# Patient Record
Sex: MALE | Race: WHITE
[De-identification: names, ages, dates, MRNs, and addresses within clinical notes are randomized per-mention and may not be internally consistent; named-entity substitution may affect disease eponyms.]

---

## 2021-12-10 ENCOUNTER — HOSPITAL ENCOUNTER (EMERGENCY)
Dept: HOSPITAL 61 - ER | Age: 61
Discharge: HOME | End: 2021-12-10
Payer: COMMERCIAL

## 2021-12-10 VITALS — WEIGHT: 198.64 LBS | BODY MASS INDEX: 29.42 KG/M2 | HEIGHT: 69 IN

## 2021-12-10 VITALS — DIASTOLIC BLOOD PRESSURE: 75 MMHG | SYSTOLIC BLOOD PRESSURE: 130 MMHG

## 2021-12-10 DIAGNOSIS — K52.9: Primary | ICD-10-CM

## 2021-12-10 DIAGNOSIS — F10.939: ICD-10-CM
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
## 2021-12-10 DIAGNOSIS — Z88.8: ICD-10-CM

## 2021-12-10 DIAGNOSIS — G89.29: ICD-10-CM

## 2021-12-10 DIAGNOSIS — I10: ICD-10-CM

## 2021-12-10 DIAGNOSIS — M19.90: ICD-10-CM

## 2021-12-10 DIAGNOSIS — E11.9: ICD-10-CM

## 2021-12-10 DIAGNOSIS — Z88.0: ICD-10-CM

## 2021-12-10 DIAGNOSIS — J44.9: ICD-10-CM

## 2021-12-10 DIAGNOSIS — F17.200: ICD-10-CM

## 2021-12-10 LAB
ALBUMIN SERPL-MCNC: 4.3 G/DL (ref 3.4–5)
ALBUMIN/GLOB SERPL: 1.2 {RATIO} (ref 1–1.7)
ALP SERPL-CCNC: 96 U/L (ref 46–116)
ALT SERPL-CCNC: 31 U/L (ref 16–63)
ANION GAP SERPL CALC-SCNC: 18 MMOL/L (ref 6–14)
AST SERPL-CCNC: 24 U/L (ref 15–37)
BASOPHILS # BLD AUTO: 0 X10^3/UL (ref 0–0.2)
BASOPHILS NFR BLD: 0 % (ref 0–3)
BILIRUB SERPL-MCNC: 0.6 MG/DL (ref 0.2–1)
BUN SERPL-MCNC: 28 MG/DL (ref 8–26)
BUN/CREAT SERPL: 23 (ref 6–20)
CALCIUM SERPL-MCNC: 10 MG/DL (ref 8.5–10.1)
CHLORIDE SERPL-SCNC: 100 MMOL/L (ref 98–107)
CO2 SERPL-SCNC: 22 MMOL/L (ref 21–32)
CREAT SERPL-MCNC: 1.2 MG/DL (ref 0.7–1.3)
EOSINOPHIL NFR BLD: 0 % (ref 0–3)
EOSINOPHIL NFR BLD: 0 X10^3/UL (ref 0–0.7)
ERYTHROCYTE [DISTWIDTH] IN BLOOD BY AUTOMATED COUNT: 13.5 % (ref 11.5–14.5)
GFR SERPLBLD BASED ON 1.73 SQ M-ARVRAT: 61.6 ML/MIN
GLUCOSE SERPL-MCNC: 167 MG/DL (ref 70–99)
HCT VFR BLD CALC: 42.8 % (ref 39–53)
HGB BLD-MCNC: 14.3 G/DL (ref 13–17.5)
LIPASE: 54 U/L (ref 73–393)
LYMPHOCYTES # BLD: 1.1 X10^3/UL (ref 1–4.8)
LYMPHOCYTES NFR BLD AUTO: 10 % (ref 24–48)
MCH RBC QN AUTO: 31 PG (ref 25–35)
MCHC RBC AUTO-ENTMCNC: 34 G/DL (ref 31–37)
MCV RBC AUTO: 94 FL (ref 79–100)
MONO #: 0.6 X10^3/UL (ref 0–1.1)
MONOCYTES NFR BLD: 5 % (ref 0–9)
NEUT #: 9.6 X10^3/UL (ref 1.8–7.7)
NEUTROPHILS NFR BLD AUTO: 85 % (ref 31–73)
PLATELET # BLD AUTO: 202 X10^3/UL (ref 140–400)
POTASSIUM SERPL-SCNC: 3.9 MMOL/L (ref 3.5–5.1)
PROT SERPL-MCNC: 7.8 G/DL (ref 6.4–8.2)
RBC # BLD AUTO: 4.58 X10^6/UL (ref 4.3–5.7)
SODIUM SERPL-SCNC: 140 MMOL/L (ref 136–145)
WBC # BLD AUTO: 11.3 X10^3/UL (ref 4–11)

## 2021-12-10 PROCEDURE — 36415 COLL VENOUS BLD VENIPUNCTURE: CPT

## 2021-12-10 PROCEDURE — 80053 COMPREHEN METABOLIC PANEL: CPT

## 2021-12-10 PROCEDURE — 83690 ASSAY OF LIPASE: CPT

## 2021-12-10 PROCEDURE — 99285 EMERGENCY DEPT VISIT HI MDM: CPT

## 2021-12-10 PROCEDURE — 74177 CT ABD & PELVIS W/CONTRAST: CPT

## 2021-12-10 PROCEDURE — 96361 HYDRATE IV INFUSION ADD-ON: CPT

## 2021-12-10 PROCEDURE — 85025 COMPLETE CBC W/AUTO DIFF WBC: CPT

## 2021-12-10 PROCEDURE — 96374 THER/PROPH/DIAG INJ IV PUSH: CPT

## 2021-12-10 PROCEDURE — 96375 TX/PRO/DX INJ NEW DRUG ADDON: CPT

## 2021-12-10 PROCEDURE — 82140 ASSAY OF AMMONIA: CPT

## 2021-12-10 NOTE — PHYS DOC
Past Medical History


Past Medical History:  Arthritis, COPD, Diabetes-Type II, Hypertension


Additional Past Medical Histor:  CHRONIC REGIONAL SYNDROME,CHRONIC RIGHT GROIN 

PAIN


 (RASHID BAUMANN)


Past Surgical History:  No Surgical History, Other


Additional Past Surgical Histo:  HERNIA X2


 (RASHID BAUMANN)


Smoking Status:  Current Every Day Smoker


Alcohol Use:  Heavy


 (RASHID BAUMANN)





General Adult


EDM:


Chief Complaint:  ABDOMINAL PAIN





HPI:


HPI:





Patient is a 61 year old male who presents with abdominal pain, vomiting, 

altered mental status.  Patient's wife is at bedside and provides history.  She 

states that last night, they both had "bad chicken salad," which led to them 

both having diarrhea.  Wife states that the patient was more affected than she 

was.  Per nursing staff, wife reports the patient is a heavy alcohol drinker, 

but had no alcohol yesterday.  Wife also reports that he did not take his 

morning prescriptions today secondary to his abdominal pain and nausea.  No 

further history can be obtained secondary to patient's altered mental status.


 (RASHID BAUMANN)





Review of Systems:


Review of Systems:


Unable to assess secondary to patient's mental status.


 (RASHID BAUMANN)





Heart Score:


C/O Chest Pain:  N/A


 (RASHID BAUMANN)





Current Medications:





Current Medications








 Medications


  (Trade)  Dose


 Ordered  Sig/You  Start Time


 Stop Time Status Last Admin


Dose Admin


 


 Info


  (CONTRAST GIVEN


 -- Rx MONITORING)  1 each  PRN DAILY  PRN  12/10/21 13:00


 12/12/21 12:59   





 


 Iohexol


  (Omnipaque 300


 Mg/ml)  75 ml  1X  ONCE  12/10/21 12:45


 12/10/21 12:46 DC 12/10/21 13:01


75 ML


 


 Lorazepam


  (Ativan Inj)  2 mg  1X  ONCE  12/10/21 13:00


 12/10/21 13:01 DC 12/10/21 13:14


2 MG


 


 Morphine Sulfate


  (Morphine


 Sulfate)  2 mg  1X  ONCE  12/10/21 12:00


 12/10/21 12:01 DC 12/10/21 12:09


2 MG


 


 Ondansetron HCl


  (Zofran)  8 mg  1X  ONCE  12/10/21 12:00


 12/10/21 12:01 DC 12/10/21 12:08


8 MG


 


 Sodium Chloride  1,000 ml @ 


 1,000 mls/hr  1X  ONCE  12/10/21 12:00


 12/10/21 12:59 DC 12/10/21 12:00


1,000 MLS/HR








 (RASHID BAUMANN)





Allergies:


Allergies:





Allergies








Coded Allergies Type Severity Reaction Last Updated Verified


 


  Penicillins Allergy Severe swelling,sob 3/8/21 Yes


 


  mold Allergy Severe sob,swelling 3/8/21 Yes








 (RASHID BAUMANN)





Physical Exam:


PE:





Constitutional: Well developed, well nourished, non-toxic appearance.


HENT: Normocephalic, atraumatic, bilateral external ears normal, oropharynx 

moist, no oral exudates, nose normal. 


Eyes: PERRLA, EOMI, conjunctiva normal, no discharge. 


Neck: Normal range of motion, no tenderness, supple, no stridor. 


Cardiovascular: Heart rate regular rhythm, no murmur.


Lungs & Thorax: Bilateral breath sounds clear to auscultation.


Abdomen: Protuberant abdomen, bowel sounds normal, soft, generalized voluntary 

guarding, no masses, no pulsatile masses.


Skin: Warm, dry, no erythema, no rash, no abrasions, no lacerations.


Back: No tenderness, no CVA tenderness. 


Extremities: No tenderness, no cyanosis, no clubbing, ROM intact, no edema. 


Neurologic: Alert and oriented to person and place only, no focal deficits 

noted. 


Psychologic: Affect slightly agitated, fair judgment, mood "help me."


 (RASHID BAUMANN)





Current Patient Data:


Labs:





                                Laboratory Tests








Test


 12/10/21


11:35


 


White Blood Count


 11.3 x10^3/uL


(4.0-11.0)  H


 


Red Blood Count


 4.58 x10^6/uL


(4.30-5.70)


 


Hemoglobin


 14.3 g/dL


(13.0-17.5)


 


Hematocrit


 42.8 %


(39.0-53.0)


 


Mean Corpuscular Volume


 94 fL ()





 


Mean Corpuscular Hemoglobin 31 pg (25-35)  


 


Mean Corpuscular Hemoglobin


Concent 34 g/dL


(31-37)


 


Red Cell Distribution Width


 13.5 %


(11.5-14.5)


 


Platelet Count


 202 x10^3/uL


(140-400)


 


Neutrophils (%) (Auto) 85 % (31-73)  H


 


Lymphocytes (%) (Auto) 10 % (24-48)  L


 


Monocytes (%) (Auto) 5 % (0-9)  


 


Eosinophils (%) (Auto) 0 % (0-3)  


 


Basophils (%) (Auto) 0 % (0-3)  


 


Neutrophils # (Auto)


 9.6 x10^3/uL


(1.8-7.7)  H


 


Lymphocytes # (Auto)


 1.1 x10^3/uL


(1.0-4.8)


 


Monocytes # (Auto)


 0.6 x10^3/uL


(0.0-1.1)


 


Eosinophils # (Auto)


 0.0 x10^3/uL


(0.0-0.7)


 


Basophils # (Auto)


 0.0 x10^3/uL


(0.0-0.2)





                                Laboratory Tests


12/10/21 11:35








Vital Signs:





                                   Vital Signs








  Date Time  Temp Pulse Resp B/P (MAP) Pulse Ox O2 Delivery O2 Flow Rate FiO2


 


12/10/21 12:09   18  98   


 


12/10/21 11:25 98.2 96  130/75 (93)  Room Air  





 98.2       








 (BAUMANNRASHID PA)





Radiology/Procedures:


Radiology/Procedures:


PROCEDURE: CT ABD PELV W/ IV CONTRST ONLY





Examination: CT of the abdomen pelvis with IV contrast





HISTORY: History of abdominal pain, vomiting





Comparison





TECHNIQUE: Axial CT images of the abdomen pelvis were performed with IV 

contrast. Coronal and sagittal reformats are performed 





Exposure: One or more of the following individualized dose reduction techniques 

were utilized for this examination:  1. Automated exposure control  2. 

Adjustment of the mA and/or kV according to patient size  3. Use of iterative 

reconstruction technique





FINDINGS:





The bibasilar lungs are clear. No evidence of free air identified.The liver, 

spleen, adrenals grossly appears unremarkable. The gallbladder is mildly 

distended. The stomach is mildly distended. The visualized pancreas grossly 

appears unremarkable. Small bowel is nondilated.





Appendix is normal. There is mild thickened appearance of the wall of the distal

descending colon with minimal surrounding fat stranding





The bilateral kidneys enhance symmetrically. Urinary bladder is mildly 

distended.





Moderate degenerative thoracic lumbar spine. 1 cm sclerotic density identified 

in the right iliac bone probably bone island.





IMPRESSION:





1. Mild thickened appearance of the wall of the distal descending colon with 

minimal surrounding fat stranding could be due to nondistention or mild colitis.





Electronically signed by: Elvin Pineda MD (12/10/2021 1:30 PM) [id number]


 (RASHID BAUMANN)





Course & Med Decision Making:


Course & Med Decision Making


Pertinent Labs and Imaging studies reviewed. (See chart for details)





Patient is difficult to assess, as he seems to be slightly altered and is 

uncooperative with exam.  Work-up today will include urinalysis, UDS, blood work

including an ammonia level.





Patient has been uncooperative while into the department.  At one point, he 

ripped out his IV and denied afterward.  Additionally, rad technologists called 

to the department while attempting to obtain CT imaging.  Per XRT, he refused to

lay down or sit still for imaging.  While on the phone with me, patient became 

more agreeable and imaging was obtained.





On reevaluation, patient is asleep.  We will obtain urine for urinalysis and UDS

when patient becomes more cooperative once more.





Patient was discharged prior to obtaining urine sample for urinalysis.  I am not

particularly suspicious of UTI or other substance use.  Patient was provided 

with I contact information for rehabilitation crisis.  Patient and his wife 

understand and are agreeable to discharge plan.


 (RASHID BAUMANN)


Course & Med Decision Making


I was the Attending physician on the above date of service of this patient. This

patient was evaluated, examined, treated, and dispositioned from the emergency 

department by the mid-level practitioner.  Although I was working at the time , 

no assistance was requested. 





Electronically signed, Devon Prince DO


 (DEVON PRINCE DO)


Althea Disclaimer:


Dragon Disclaimer:


This electronic medical record was generated, in whole or in part, using a voice

recognition dictation system.


 (RASHID BAUMANN)





Departure


Departure


Impression:  


   Primary Impression:  


   Alcohol use with withdrawal without complication


   Additional Impressions:  


   Alcohol use disorder


   Colitis without complication


Disposition:  01 HOME / SELF CARE / HOMELESS


Condition:  STABLE


Referrals:  


DEMETRICE ANDERSON (PCP)


Patient Instructions:  Abdominal Pain, Easy-to-Read, Alcohol Withdrawal, 

Easy-to-Read





Additional Instructions:  


Your work-up for your vomiting and abdominal pain today revealed that you do 

have inflammation of the colon.  It is not complicated and does not require any 

surgical or antibiotic intervention at this time.





Because you are a daily heavy alcohol drinker, you will likely have withdrawal 

symptoms if you go more than a day without drinking alcohol.  You are provided 

today with rehabilitation service contact information.  At this point, you are 

medically cleared to obtain rehabilitation care, if you so choose.





Return to the emergency department for any worsening symptoms or new symptoms.  

These include fever, vomiting, diarrhea, bloody stool or vomit, seizure, 

continued altered mental status.


Scripts


Ondansetron (ONDANSETRON ODT) 4 Mg Tab.rapdis


1 TAB PO PRN Q6-8HRS, #20 TAB


   Prov: RASHID BAUMANN         12/10/21











RASHID BAUMANN              Dec 10, 2021 12:23


DEVON PRINCE DO                 Dec 14, 2021 07:12

## 2021-12-10 NOTE — RAD
Examination: CT of the abdomen pelvis with IV contrast



HISTORY: History of abdominal pain, vomiting



Comparison



TECHNIQUE: Axial CT images of the abdomen pelvis were performed with IV contrast. Coronal and sagitta
l reformats are performed 



Exposure: One or more of the following individualized dose reduction techniques were utilized for thi
s examination:  1. Automated exposure control  2. Adjustment of the mA and/or kV according to patient
 size  3. Use of iterative reconstruction technique



FINDINGS:



The bibasilar lungs are clear. No evidence of free air identified.The liver, spleen, adrenals grossly
 appears unremarkable. The gallbladder is mildly distended. The stomach is mildly distended. The visu
alized pancreas grossly appears unremarkable. Small bowel is nondilated.



Appendix is normal. There is mild thickened appearance of the wall of the distal descending colon wit
h minimal surrounding fat stranding



The bilateral kidneys enhance symmetrically. Urinary bladder is mildly distended.



Moderate degenerative thoracic lumbar spine. 1 cm sclerotic density identified in the right iliac bon
e probably bone island.



IMPRESSION:



1. Mild thickened appearance of the wall of the distal descending colon with minimal surrounding fat 
stranding could be due to nondistention or mild colitis.



Electronically signed by: Elvin Pineda MD (12/10/2021 1:30 PM) [id number]